# Patient Record
Sex: MALE | Race: BLACK OR AFRICAN AMERICAN | URBAN - METROPOLITAN AREA
[De-identification: names, ages, dates, MRNs, and addresses within clinical notes are randomized per-mention and may not be internally consistent; named-entity substitution may affect disease eponyms.]

---

## 2020-09-11 PROBLEM — T42.4X2A BENZODIAZEPINE OVERDOSE, INTENTIONAL SELF-HARM, INITIAL ENCOUNTER (HCC): Status: ACTIVE | Noted: 2020-09-11

## 2020-09-11 PROBLEM — G93.40 ENCEPHALOPATHY: Status: ACTIVE | Noted: 2020-09-11

## 2020-09-11 PROBLEM — T50.902A INTENTIONAL DRUG OVERDOSE (HCC): Status: ACTIVE | Noted: 2020-09-11

## 2022-07-21 ENCOUNTER — HOSPITAL ENCOUNTER (EMERGENCY)
Age: 27
Discharge: HOME | End: 2022-07-22
Attending: STUDENT IN AN ORGANIZED HEALTH CARE EDUCATION/TRAINING PROGRAM

## 2022-07-21 DIAGNOSIS — T40.601A OPIATE OVERDOSE, ACCIDENTAL OR UNINTENTIONAL, INITIAL ENCOUNTER (HCC): Primary | ICD-10-CM

## 2022-07-21 PROCEDURE — 99283 EMERGENCY DEPT VISIT LOW MDM: CPT

## 2022-07-21 PROCEDURE — 74011250637 HC RX REV CODE- 250/637: Performed by: PHYSICIAN ASSISTANT

## 2022-07-21 RX ORDER — ONDANSETRON 4 MG/1
4 TABLET, ORALLY DISINTEGRATING ORAL
Status: COMPLETED | OUTPATIENT
Start: 2022-07-21 | End: 2022-07-21

## 2022-07-21 RX ADMIN — ONDANSETRON 4 MG: 4 TABLET, ORALLY DISINTEGRATING ORAL at 23:34

## 2022-07-22 VITALS
WEIGHT: 180 LBS | SYSTOLIC BLOOD PRESSURE: 98 MMHG | TEMPERATURE: 97.2 F | OXYGEN SATURATION: 97 % | DIASTOLIC BLOOD PRESSURE: 69 MMHG | BODY MASS INDEX: 27.37 KG/M2 | HEART RATE: 85 BPM | RESPIRATION RATE: 16 BRPM

## 2022-07-22 RX ORDER — NALOXONE HYDROCHLORIDE 4 MG/.1ML
SPRAY NASAL
Qty: 1 EACH | Refills: 0 | Status: SHIPPED | OUTPATIENT
Start: 2022-07-22

## 2022-07-24 NOTE — ED PROVIDER NOTES
EMERGENCY DEPARTMENT HISTORY AND PHYSICAL EXAM      Date: 7/21/2022  Patient Name: Moisés Romeo    History of Presenting Illness     Chief Complaint   Patient presents with    Drug Overdose       History Provided By: Patient    HPI: Moisés Romeo, 28 y.o. male PMHx significant for polysubstance, depression presents via ambulance to the ED. Per EMS patient was given 2 mg of intranasal Narcan after he was found apneic at party. Bystanders told EMS pt snorted 1 capsule of heroin. Pt denies heroin use. Pt reports drinking alcohol but denies illicit drug use. Pt has not complaints at this time. There are no other complaints, changes, or physical findings at this time. PCP: None    No current facility-administered medications on file prior to encounter. Current Outpatient Medications on File Prior to Encounter   Medication Sig Dispense Refill    traZODone (DESYREL) 50 mg tablet TAKE 1 TABLET BY MOUTH EVERY NIGHT AT BEDTIME (Patient not taking: Reported on 7/21/2022)      hydrOXYzine pamoate (VISTARIL) 50 mg capsule TAKE 1 CAPSULE BY MOUTH EVERY 4 HOURS AS NEEDED (Patient not taking: Reported on 7/21/2022)      ziprasidone (GEODON) 40 mg capsule TAKE 1 CAPSULE BY MOUTH AT 6 00PM DAILY (Patient not taking: Reported on 7/21/2022)         Past History     Past Medical History:  Past Medical History:   Diagnosis Date    Benzodiazepine overdose, intentional self-harm, initial encounter ([facility] [address] 07/2020    Encephalopathy     Suicidal behavior with attempted self-injury [facility])        Past Surgical History:  No past surgical history on file.     Family History:  Family History   Family history unknown: Yes       Social History:  Social History     Tobacco Use    Smoking status: Every Day     Packs/day: 1.00     Types: Cigarettes    Smokeless tobacco: Never   Substance Use Topics    Alcohol use: Yes     Comment: daily drinker    Drug use: Not Currently     Types: Benzodiazepines       Allergies:  No Known Allergies      Review of Systems   Review of Systems   Constitutional:  Negative for chills and fever. HENT:  Negative for facial swelling. Eyes:  Negative for photophobia and visual disturbance. Respiratory:  Negative for shortness of breath. Cardiovascular:  Negative for chest pain. Gastrointestinal:  Negative for abdominal pain, nausea and vomiting. Genitourinary:  Negative for flank pain. Skin:  Negative for color change, pallor, rash and wound. Neurological:  Negative for dizziness, weakness, light-headedness and headaches. All other systems reviewed and are negative. Physical Exam   Physical Exam  Vitals and nursing note reviewed. Constitutional:       General: He is not in acute distress. Appearance: He is well-developed. Comments: Pt in NAD   HENT:      Head: Normocephalic and atraumatic. Eyes:      Conjunctiva/sclera: Conjunctivae normal.   Cardiovascular:      Rate and Rhythm: Normal rate and regular rhythm. Heart sounds: Normal heart sounds. Pulmonary:      Effort: Pulmonary effort is normal. No respiratory distress. Breath sounds: Normal breath sounds. Comments: Lungs CTA  Not working to breathe    Abdominal:      General: Bowel sounds are normal. There is no distension. Palpations: Abdomen is soft. Comments: Abdomen soft, nontender  Nondistended  No guarding or rigidity  Actively vomiting   Musculoskeletal:         General: Normal range of motion. Skin:     General: Skin is warm. Findings: No rash. Neurological:      Mental Status: He is alert and oriented to person, place, and time. Psychiatric:         Behavior: Behavior normal.       Diagnostic Study Results     Labs -   No results found for this or any previous visit (from the past 12 hour(s)).     Radiologic Studies -   No orders to display     CT Results  (Last 48 hours)      None          CXR Results  (Last 48 hours)      None            Medical Decision Making   I am the first provider for this patient. I reviewed the vital signs, available nursing notes, past medical history, past surgical history, family history and social history. Vital Signs-Reviewed the patient's vital signs. No data found. Records Reviewed: Nursing Notes, Old Medical Records, Previous Radiology Studies, and Previous Laboratory Studies    Provider Notes (Medical Decision Making):   DDx: Opiate overdose    29 yo M who presents with opiate overdose. Given 2 mg IN narcan by EMS. Patient monitored for 2 hours and oxygen saturation remained stable. Discharged home with Narcan. At time of discharge, pt non-toxic appearing in NAD. Pt stable for prompt outpatient follow-up with PCP 1 to 2 days. Patient given strict instructions to return if symptoms worsen. ED Course:   Initial assessment performed. The patients presenting problems have been discussed, and they are in agreement with the care plan formulated and outlined with them. I have encouraged them to ask questions as they arise throughout their visit. Disposition:  Discussed dx and treatment plan. Discussed importance of PCP follow up. All questions answered. Pt voiced they understood. Return if sx worsen. PLAN:  1. Discharge Medication List as of 7/22/2022 12:24 AM        START taking these medications    Details   naloxone (Narcan) 4 mg/actuation nasal spray Use 1 spray intranasally, then discard. Repeat with new spray every 2 min as needed for opioid overdose symptoms, alternating nostrils. , Normal, Disp-1 Each, R-0           CONTINUE these medications which have NOT CHANGED    Details   traZODone (DESYREL) 50 mg tablet TAKE 1 TABLET BY MOUTH EVERY NIGHT AT BEDTIME, Historical Med      hydrOXYzine pamoate (VISTARIL) 50 mg capsule TAKE 1 CAPSULE BY MOUTH EVERY 4 HOURS AS NEEDED, Historical Med      ziprasidone (GEODON) 40 mg capsule TAKE 1 CAPSULE BY MOUTH AT 6 00PM DAILY, Historical Med           2.    Follow-up Information Follow up With Specialties Details Why [facility]  Schedule an appointment as soon as possible for a visit in 1 day  CtrgisellRandy Baca [address]  [address]  [address] [facility] Emergency Medicine  As needed, If symptoms worsen [address] [address]  [phone]          Return to ED if worse     Diagnosis     Clinical Impression:   1. Opiate overdose, accidental or unintentional, initial encounter [facility])        Attestations:    TARIQ Sykes    Please note that this dictation was completed with V I O, the computer voice recognition software. Quite often unanticipated grammatical, syntax, homophones, and other interpretive errors are inadvertently transcribed by the computer software. Please disregard these errors. Please excuse any errors that have escaped final proofreading. Thank you.